# Patient Record
Sex: FEMALE | Race: WHITE | NOT HISPANIC OR LATINO | Employment: FULL TIME | URBAN - METROPOLITAN AREA
[De-identification: names, ages, dates, MRNs, and addresses within clinical notes are randomized per-mention and may not be internally consistent; named-entity substitution may affect disease eponyms.]

---
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
## 2024-07-18 ENCOUNTER — HOSPITAL ENCOUNTER (OUTPATIENT)
Dept: GENERAL RADIOLOGY | Facility: HOSPITAL | Age: 19
Discharge: HOME | End: 2024-07-18
Attending: STUDENT IN AN ORGANIZED HEALTH CARE EDUCATION/TRAINING PROGRAM | Admitting: STUDENT IN AN ORGANIZED HEALTH CARE EDUCATION/TRAINING PROGRAM
Payer: COMMERCIAL

## 2024-07-18 ENCOUNTER — OFFICE VISIT (OUTPATIENT)
Dept: FAMILY MEDICINE | Facility: CLINIC | Age: 19
End: 2024-07-18
Payer: COMMERCIAL

## 2024-07-18 VITALS
OXYGEN SATURATION: 98 % | BODY MASS INDEX: 39.94 KG/M2 | HEART RATE: 98 BPM | SYSTOLIC BLOOD PRESSURE: 124 MMHG | RESPIRATION RATE: 18 BRPM | TEMPERATURE: 98.2 F | WEIGHT: 255 LBS | DIASTOLIC BLOOD PRESSURE: 82 MMHG

## 2024-07-18 DIAGNOSIS — R05.1 ACUTE COUGH: ICD-10-CM

## 2024-07-18 DIAGNOSIS — J18.9 PNEUMONIA OF RIGHT LOWER LOBE DUE TO INFECTIOUS ORGANISM: Primary | ICD-10-CM

## 2024-07-18 PROCEDURE — 71046 X-RAY EXAM CHEST 2 VIEWS: CPT

## 2024-07-18 PROCEDURE — 99204 OFFICE O/P NEW MOD 45 MIN: CPT | Performed by: STUDENT IN AN ORGANIZED HEALTH CARE EDUCATION/TRAINING PROGRAM

## 2024-07-18 RX ORDER — ARIPIPRAZOLE 2 MG/1
2 TABLET ORAL DAILY
COMMUNITY

## 2024-07-18 RX ORDER — AZITHROMYCIN 250 MG/1
TABLET, FILM COATED ORAL
Qty: 6 TABLET | Refills: 0 | Status: SHIPPED | OUTPATIENT
Start: 2024-07-18 | End: 2024-07-23

## 2024-07-18 RX ORDER — HYDROXYZINE HYDROCHLORIDE 25 MG/1
25 TABLET, FILM COATED ORAL 3 TIMES DAILY PRN
COMMUNITY

## 2024-07-18 NOTE — PATIENT INSTRUCTIONS
Your chest xray shows pneumonia.  Take Augmentin and azithromycin as directed.  You are contagious until you have been on antibiotics for 24 hours.  May use cough suppressant as needed.  Drink plenty of fluids and get lots of rest.    Take acetaminophen and/or ibuprofen as needed for pain or fever  Acetaminophen (tylenol)  325-650 mg every 4-6 hours as needed OR 1000 mg every 8 hours as needed. Maximum dose: 3000 mg/day  Ibuprofen (advil, motrin)  200-400 mg every 4-6 hours as needed  mg every 6-8 hours. Maximum dose: 2400 mg/day

## 2024-07-18 NOTE — PROGRESS NOTES
ASSESSMENT & PLAN:   Diagnoses and all orders for this visit:  Pneumonia of right lower lobe due to infectious organism  -     amoxicillin-clavulanate (AUGMENTIN) 875-125 MG tablet; Take 1 tablet by mouth 2 times daily for 7 days  -     azithromycin (ZITHROMAX) 250 MG tablet; Take 2 tablets (500 mg) by mouth daily for 1 day, THEN 1 tablet (250 mg) daily for 4 days.  Acute cough  -     XR Chest 2 Views; Future    Shortness of breath and cough x 1 week, exposure to pneumonia. Vitals stable - O2 98% and afebrile. Exam is benign - clear lung sounds and no increased work of breathing. Chest XR with RLL infiltrate per my read. Augmentin x 7 days and Zpack for pneumonia.     At the end of the encounter, I discussed results, diagnosis, medications. Discussed red flags for immediate return to clinic/ER, as well as indications for follow up if no improvement. Patient and/or caregiver understood and agreed to plan. Patient was stable for discharge.    Patient Instructions   Your chest xray shows pneumonia.  Take Augmentin and azithromycin as directed.  You are contagious until you have been on antibiotics for 24 hours.  May use cough suppressant as needed.  Drink plenty of fluids and get lots of rest.    Take acetaminophen and/or ibuprofen as needed for pain or fever  Acetaminophen (tylenol)  325-650 mg every 4-6 hours as needed OR 1000 mg every 8 hours as needed. Maximum dose: 3000 mg/day  Ibuprofen (advil, motrin)  200-400 mg every 4-6 hours as needed  mg every 6-8 hours. Maximum dose: 2400 mg/day     Return in about 3 days (around 7/21/2024) for symptoms not improving, sooner if needed.    ------------------------------------------------------------------------  SUBJECTIVE  History was obtained from patient.    Patient presents with:  Cough: 3 days Cough,neck pain,sob headache and vertigo.tested Covid yesterday was negative.    HPI  Humera Alvarez is a(n) 19 year old female presenting to urgent care for shortness  of breath x 1 week. Symptoms started as feeling like she could not get a deep breath. Symptoms have been worsening. She then developed a cough. Cough is nonproductive. Yesterday developed fatigue and sweats. She has been having pain in posterior neck that occurs at night. This is worse with movement and improved after laying down. Exposed to pneumonia prior to symptoms. No history of asthma. Negative at-home COVID test x 2. She was seen in clinic yesterday (note not available) - prescribed cough medication and inhaler - no improvement.     Review of Systems    Current Outpatient Medications   Medication Sig Dispense Refill    amoxicillin-clavulanate (AUGMENTIN) 875-125 MG tablet Take 1 tablet by mouth 2 times daily for 7 days 14 tablet 0    ARIPiprazole (ABILIFY) 2 MG tablet Take 2 mg by mouth daily      azithromycin (ZITHROMAX) 250 MG tablet Take 2 tablets (500 mg) by mouth daily for 1 day, THEN 1 tablet (250 mg) daily for 4 days. 6 tablet 0    hydrOXYzine HCl (ATARAX) 25 MG tablet Take 25 mg by mouth 3 times daily as needed for itching       Problem List:  2021-04: Depression with anxiety    No Known Allergies      OBJECTIVE  Vitals:    07/18/24 1112   BP: 124/82   BP Location: Right arm   Patient Position: Sitting   Cuff Size: Adult Regular   Pulse: 98   Resp: 18   Temp: 98.2  F (36.8  C)   TempSrc: Oral   SpO2: 98%   Weight: 115.7 kg (255 lb)     Physical Exam   GENERAL: healthy, alert, no acute distress.   PSYCH: mentation appears normal. Normal affect  HEAD: normocephalic, atraumatic.  EYE: PERRL. EOMs intact. No scleral injection bilaterally.   EAR: external ear normal. Bilateral ear canals normal and nonpainful. Bilateral TM intact, pearly, translucent without bulging.  NOSE: external nose atraumatic without lesions.  OROPHARYNX: moist mucous membranes. Posterior oropharynx without erythema or exudate. Uvula midline. Patent airway.  LUNGS: no increased work of breathing. Clear lung sounds bilaterally. No  wheezing, rhonchi, or rales.   CV: regular rate and rhythm. No clicks, murmurs, or rubs.    Xrays were preliminarily reviewed by me - SHAJI trujillo.     No results found for any visits on 07/18/24.

## 2024-07-23 PROBLEM — R53.82 CHRONIC FATIGUE: Status: ACTIVE | Noted: 2024-07-23

## 2024-07-27 ENCOUNTER — HEALTH MAINTENANCE LETTER (OUTPATIENT)
Age: 19
End: 2024-07-27

## 2024-08-04 ENCOUNTER — OFFICE VISIT (OUTPATIENT)
Dept: FAMILY MEDICINE | Facility: CLINIC | Age: 19
End: 2024-08-04
Payer: COMMERCIAL

## 2024-08-04 ENCOUNTER — HOSPITAL ENCOUNTER (OUTPATIENT)
Dept: GENERAL RADIOLOGY | Facility: HOSPITAL | Age: 19
Discharge: HOME | End: 2024-08-04
Attending: NURSE PRACTITIONER | Admitting: NURSE PRACTITIONER
Payer: COMMERCIAL

## 2024-08-04 VITALS
OXYGEN SATURATION: 98 % | HEART RATE: 67 BPM | TEMPERATURE: 97.7 F | DIASTOLIC BLOOD PRESSURE: 71 MMHG | RESPIRATION RATE: 16 BRPM | SYSTOLIC BLOOD PRESSURE: 109 MMHG | BODY MASS INDEX: 39.89 KG/M2 | WEIGHT: 254.7 LBS

## 2024-08-04 DIAGNOSIS — M25.461 EFFUSION OF RIGHT KNEE: Primary | ICD-10-CM

## 2024-08-04 DIAGNOSIS — M25.561 ACUTE PAIN OF RIGHT KNEE: ICD-10-CM

## 2024-08-04 PROCEDURE — 73562 X-RAY EXAM OF KNEE 3: CPT | Mod: RT

## 2024-08-04 PROCEDURE — 99213 OFFICE O/P EST LOW 20 MIN: CPT | Performed by: NURSE PRACTITIONER

## 2024-08-04 RX ORDER — NAPROXEN 500 MG/1
500 TABLET ORAL 2 TIMES DAILY PRN
Qty: 30 TABLET | Refills: 1 | Status: SHIPPED | OUTPATIENT
Start: 2024-08-04

## 2024-08-04 NOTE — PROGRESS NOTES
Patient presents with:  Knee Pain: R knee x3d, don't recall any injury, got worse at work yesterday      Clinical Decision Making: Focused exam positive for right knee with discomfort at lateral patellar space, posterior aspect of knee and unable to fully flex due to discomfort.  Minimal swelling, no warmth or erythemic appearance.  No ecchymosis.  Pulses 2+.  Strength 5/5.  No antalgic gait.  Right knee x-ray shows minimal/small right knee effusion.    Clinical presentation and medical decision making consistent with right knee effusion.  Will place in compression knee sleeve.  Encouraged the use of NSAIDs with food, short prescription for Naprosyn sent.  Encouraged ice application and elevation.  Referral for orthopedic provider placed. Reviewed red flag symptoms and when to return for reevaluation, letter for work and education provided.      ICD-10-CM    1. Effusion of right knee  M25.461 Orthopedic  Referral     Ankle/Knee Bracing Supplies Order Knee Sleeve/Brace; Right; Open     naproxen (NAPROSYN) 500 MG tablet      2. Acute pain of right knee  M25.561 XR Knee Right 3 Views     Ankle/Knee Bracing Supplies Order Knee Sleeve/Brace; Right; Open     naproxen (NAPROSYN) 500 MG tablet          Patient Instructions   If not improving as discussed please follow-up with orthopedic provider in the next 1 week.    HPI: Humera Alvarez is a 19 year old female who presents today complaining of sudden onset right knee pain for the past 3 days.  Denies any known injury or fall.  Endorses difficulty working yesterday due to significant pain.  Reports pain is 8.5/10 on the numeric pain scale.  Reports taking OTC acetaminophen with limited relief.    History obtained from the patient.  LMP: 7/13/2024    Problem List:  2024-07: Chronic fatigue  2021-04: Depression with anxiety      No past medical history on file.    Social History     Tobacco Use    Smoking status: Never    Smokeless tobacco: Never   Substance Use  Topics    Alcohol use: Not on file       Review of Systems  As noted in HPI    Vitals:    08/04/24 0959   BP: 109/71   Pulse: 67   Resp: 16   Temp: 97.7  F (36.5  C)   TempSrc: Oral   SpO2: 98%   Weight: 115.5 kg (254 lb 11.2 oz)       Physical Exam  Constitutional:       Appearance: Normal appearance.   Cardiovascular:      Rate and Rhythm: Normal rate and regular rhythm.      Pulses: Normal pulses.      Heart sounds: Normal heart sounds.   Pulmonary:      Effort: Pulmonary effort is normal.      Breath sounds: Normal breath sounds.   Musculoskeletal:         General: Tenderness present. No swelling, deformity or signs of injury.      Comments: Right knee with discomfort at lateral patellar space, posterior aspect of knee and unable to fully flex due to discomfort.  Minimal swelling, no warmth or erythemic appearance.  No ecchymosis.  Pulses 2+.  Strength 5/5.  No antalgic gait.   Skin:     Capillary Refill: Capillary refill takes less than 2 seconds.      Findings: No bruising.   Neurological:      Mental Status: She is alert and oriented to person, place, and time.      Sensory: No sensory deficit.      Motor: No weakness.      Coordination: Coordination normal.      Gait: Gait normal.      Deep Tendon Reflexes: Reflexes normal.         Labs:  Results for orders placed or performed during the hospital encounter of 08/04/24   XR Knee Right 3 Views     Status: None    Narrative    EXAM: XR KNEE RIGHT 3 VIEWS  LOCATION: [facility]  DATE: 8/4/2024    INDICATION: Right knee with significant discomfort when weightbearing, unable to fully flex due to discomfort behind knee and lateral patella space.  Minimal swelling noted, no warmth.  Please eval for bursitis versus other acute process?  COMPARISON: None.      Impression    IMPRESSION: No acute fracture or malalignment. There is normal joint spacing. Minimal knee joint effusion.         At the end of the encounter, I discussed results,  diagnosis, medications. Discussed red flags for immediate return to clinic/ER, as well as indications for follow up if no improvement. Patient understood and agreed to plan.     AMALIA Callahan CNP

## 2024-08-04 NOTE — LETTER
August 4, 2024      Humera L Antonio  [address] [place] [address]        To Whom It May Concern:    Humera Alvarez  was seen on 08/04/2024.  Please excuse her  until 08/06/2024 due to illness.        Sincerely,        AMALIA Callahan CNP

## 2025-08-08 ENCOUNTER — HOSPITAL ENCOUNTER (OUTPATIENT)
Dept: GENERAL RADIOLOGY | Facility: HOSPITAL | Age: 20
Discharge: HOME | End: 2025-08-08
Attending: EMERGENCY MEDICINE | Admitting: EMERGENCY MEDICINE

## 2025-08-08 DIAGNOSIS — M79.671 RIGHT FOOT PAIN: ICD-10-CM

## 2025-08-08 PROCEDURE — 73630 X-RAY EXAM OF FOOT: CPT | Mod: RT

## 2025-08-10 ENCOUNTER — HEALTH MAINTENANCE LETTER (OUTPATIENT)
Age: 20
End: 2025-08-10